# Patient Record
Sex: FEMALE | HISPANIC OR LATINO | Employment: FULL TIME | URBAN - METROPOLITAN AREA
[De-identification: names, ages, dates, MRNs, and addresses within clinical notes are randomized per-mention and may not be internally consistent; named-entity substitution may affect disease eponyms.]

---

## 2022-01-25 LAB
EXTERNAL HEPATITIS B SURFACE ANTIGEN: NEGATIVE
EXTERNAL HEPATITIS C AB: NEGATIVE
EXTERNAL RUBELLA QUALITATIVE: NORMAL
EXTERNAL SYPHILIS RPR SCREEN: NORMAL
HIV1 P24 AG SERPL QL IA: NORMAL

## 2022-01-31 ENCOUNTER — TRANSCRIBE ORDERS (OUTPATIENT)
Dept: ULTRASOUND IMAGING | Facility: HOSPITAL | Age: 37
End: 2022-01-31

## 2022-01-31 DIAGNOSIS — O09.521 AMA (ADVANCED MATERNAL AGE) MULTIGRAVIDA 35+, FIRST TRIMESTER: Primary | ICD-10-CM

## 2022-03-07 ENCOUNTER — TRANSCRIBE ORDERS (OUTPATIENT)
Dept: ULTRASOUND IMAGING | Facility: HOSPITAL | Age: 37
End: 2022-03-07

## 2022-03-07 ENCOUNTER — OFFICE VISIT (OUTPATIENT)
Dept: OBSTETRICS AND GYNECOLOGY | Facility: CLINIC | Age: 37
End: 2022-03-07

## 2022-03-07 ENCOUNTER — HOSPITAL ENCOUNTER (OUTPATIENT)
Dept: ULTRASOUND IMAGING | Facility: HOSPITAL | Age: 37
Discharge: HOME | End: 2022-03-07
Admitting: OBSTETRICS & GYNECOLOGY

## 2022-03-07 VITALS
WEIGHT: 238.8 LBS | TEMPERATURE: 98.2 F | SYSTOLIC BLOOD PRESSURE: 117 MMHG | DIASTOLIC BLOOD PRESSURE: 61 MMHG | HEIGHT: 66 IN | BODY MASS INDEX: 38.38 KG/M2 | HEART RATE: 90 BPM

## 2022-03-07 DIAGNOSIS — Z67.91 RH NEGATIVE STATUS DURING PREGNANCY IN SECOND TRIMESTER: ICD-10-CM

## 2022-03-07 DIAGNOSIS — O26.892 RH NEGATIVE STATUS DURING PREGNANCY IN SECOND TRIMESTER: ICD-10-CM

## 2022-03-07 DIAGNOSIS — O99.210 OBESITY IN PREGNANCY, ANTEPARTUM: ICD-10-CM

## 2022-03-07 DIAGNOSIS — O09.522 MULTIGRAVIDA OF ADVANCED MATERNAL AGE IN SECOND TRIMESTER: Primary | ICD-10-CM

## 2022-03-07 DIAGNOSIS — O09.521 AMA (ADVANCED MATERNAL AGE) MULTIGRAVIDA 35+, FIRST TRIMESTER: ICD-10-CM

## 2022-03-07 PROBLEM — O26.899 RH NEGATIVE STATUS DURING PREGNANCY: Status: ACTIVE | Noted: 2022-03-07

## 2022-03-07 PROCEDURE — 99204 OFFICE O/P NEW MOD 45 MIN: CPT | Performed by: OBSTETRICS & GYNECOLOGY

## 2022-03-07 PROCEDURE — 76811 OB US DETAILED SNGL FETUS: CPT | Performed by: OBSTETRICS & GYNECOLOGY

## 2022-03-07 PROCEDURE — 76811 OB US DETAILED SNGL FETUS: CPT

## 2022-03-07 RX ORDER — PRENATAL VIT NO.126/IRON/FOLIC 28MG-0.8MG
1 TABLET ORAL DAILY
COMMUNITY

## 2022-03-07 NOTE — PROGRESS NOTES
Consultation:     Amira Scanlon is a 36 y.o.  female G 6 P MP 10/05/21 FREEMAN 22 now 21 6/7 weeks seen in consultation as requested by Mell Turcios MD secondary to:    1) AMA -  risk Tri 21 (36 @ [facility])  2) Rh negative  3 )obesity - BMI - 38 54    Vitals:    22 0924   BP: 117/61   Pulse: 90   Temp: 98.2 °F (36.8 °C)     Pre-matt Labs:    A negative/antibody - negative; RI; U C&S - negative; HIV  Hep B sAg - negative; Varicella immune; Hep C - negative; H/H 12.0/36.1; NIPT - low risk.; UDS - negative      Previous Obstetrical History:    OB History    Para Term  AB Living   1             SAB IAB Ectopic Molar Multiple Live Births                    # Outcome Date GA Lbr Andrew/2nd Weight Sex Delivery Anes PTL Lv   1 Current              1)  - First trimester spontaneous  without D&C  2)  -  @ term @, female 8 lb 6 oz, complicated by 4th degree laceration  3)  -  First trimester spontaneous  without D&C  4) 2017 -  First trimester spontaneous  without D&C  5) 2018 - Primary C/S @ term secondary to previous 4th degree laceration, male, 7 lb 14 oz, without complications    Previous Gyn History:    20  Patient denies GC/Chlamydia/Syphilis.    Catamenia -  10 x 28-30 x 5  Contraception - condoms    Previous Medical History:    DM - patient denies HTN - patient denies Asthma - patient denies      Thyroid Disease - patient denies  Rheumatic Fever - patient denies  Heart - patient denies   Lung - patient denies   Liver - patient denies  Kidney - patient denies   Fever - patient denies  TB - patient denies Herpes - patient denies    UTI - patient denies   Epilepsy - patient denies  Other - neg    Previous Surgical History:    1) As above  2)  - Laparoscopic cholecystectomy without complications  3)  - [place] teeth extraction without complications    Medications:    PNV    Allergies:    NKDA        No current outpatient medications on file  prior to visit.     No current facility-administered medications on file prior to visit.        Habits:    Smoking - neg  Drinking - neg  Drugs - neg    Psychosocial:    ; post office    Sexual Abuse History: never  Physical Abuse History: never  Verbal Abuse History: never    Family History:    DM - M, F, B x 2, S x 2 HTN - neg Twins - GGM   Stillborns - neg Birth defects - neg Mental Retardation - neg  Blood Dyscrasias - neg    Anesthesia Complications - neg Genetic - neg  Other - neg    Review of Systems   Otherwise negative      Remainder of exam deferred.      ADVANCED MATERNAL AGE:    General counseling regarding advanced maternal age was then performed.  Included in this counseling was increased risk for Trisomy 21, pre-eclampsia, IUGR, gestational diabetes and   increased  morbidity and mortality.  The roles of early 1 hr. Glucola screening with repeat @ 24-28 weeks, serial U/S every 4 weeks for fetal growth and close A-P surveillance beginning at 32 weeks were discussed.  For patients aged 40 and above, delivery @ 39 weeks is recommended.     Catracho et al: Increased maternal Age and Risk of Fetal death.  NEJM 1995; 333:953-957    David COURTNEY et al:  The impact of maternal age on fetal death: does length of gestation matter?  Am J Obstet Gynecol. 2010 Dec;203 (6):554.e1-8.      TRISOMY 21:    Genetic counseling was then performed.  The association of Trisomy 21 with mental retardation and various birth defects. The risks and benefits of CVS (if first trimester),and amniocentesis  were discussed including a 1% risk of pregnancy loss with CVS  vs. a 1/350 to 1/500 risk for pregnancy loss with amniocentesis were discussed as well as management options.  Also discussed was alternative non-invasive  testing (NIPT) which will identify 99.1 % of fetuses with Trisomy 21.    Results based on maternal serum screening are gestationally age dependent for accurate risk assessment.  Eighty  percent of fetuses with Trisomy 21 will have some marker for Trisomy 21 on U/S but 20 % will not.  Also discussed was alternative non-invasive  testing (NIPT) which will identify 99.1 % of fetuses with Trisomy 21.    Rh NEGATIVE:    General counseling was performed regarding Rh negative status.  The pathogenesis of isoimmunization was explained.  Although historically, the majority of isoimmunization occurred after delivery, a small percentage of isoimmunization has occurred during pregnancy, with or without an episode of vaginal bleeding.  Prophylactic Rhogam @ 28 weeks is recommended with a repeat dose after delivery (within 72 hours), if a Rh positive infant is born.    OBESITY:    General counseling was then performed regarding obesity.  Thirty percent of the American adults are obese (BMI of 30 or greater) and an additional sixty percent are overweight (BMI 25-29).    Fifty-five percent of child bearing women are overweight.  Furthermore, twenty-nine percent are obese and six percent are morbidly obese (BMI of 40 or greater).  Complications specifically related to pregnancy in obese women may include: fetal anomalies, neural tube defects (BMI > 31), fetal macrosomia, gestational diabetes, gestational hypertension, inadequate lactation, increased induction of labor, increased rates of  section, infertility, multifetal gestation, obstructive sleep apnea, operative vaginal delivery,  mortality, placental abruption and previa, PCOD, postpartum and postterm hemorrhage, postterm pregnancy, pre-eclampsia,  labor and delivery, prolonged labor, shoulder dystocia, spontaneous  , urinary tract infections and wound infections.  Recommended weight gain in pregnancy for obese women should be limited to 15 lbs. or less.  First trimester/early second trimester diabetic evaluation should be performed and if negative, followed by re-evaluation at 24-28 weeks.  Serial U/S to evaluate fetal  growth and close A-P surveillance in the third trimester is recommended for morbidly obese patients.    For Class 3 obese patients (BMI 40 or greater), undergoing  section, both mechanical (sequential compression devices) and pharmacologic prophylaxis against DVT (intermediate dosing enoxaparin 1 mg/kg).       ACOG Committee Opinion (# 315, 2005) and AJOG published article by NUVIA Hoyt et al: Maternal Obesity and Risk of  Stillbirth: a [place] analysis 2007.       IMPRESSIONS:    1) AMA -  risk Tri 21 (36 @ [facility])  2) Rh negative  3 Obesity - BMI - 38 54      RECOMMENDATIONS:    1) Follow-up growth and completion of anatomy in 4 weeks  2) 50 gram Glucola now with repeat @ 24-28 weeks to be done by Provider  3) Serial U/S every 4 weeks for fetal growth  4) Weekly BPP beginning @ 32-36 weeks      Total time spent TODAY on this encounter, including pre-visit review of separately obtained history, face-to-face interaction including greater than 50% time spent in consultation performing medically appropriate physical exam, patient counseling/education with greater than 50% in counseling, interpretation of diagnostic results, care coordination and documentation was 40 minutes.      Thank you for utilizing our ultrasound and consultative services.  If I may be of any further service, please do not hesitate to contact me.    Sincerely,    Israel Ko MD  Maternal-Fetal Medicine

## 2022-04-05 ENCOUNTER — OFFICE VISIT (OUTPATIENT)
Dept: OBSTETRICS AND GYNECOLOGY | Facility: CLINIC | Age: 37
End: 2022-04-05

## 2022-04-05 ENCOUNTER — HOSPITAL ENCOUNTER (OUTPATIENT)
Dept: ULTRASOUND IMAGING | Facility: HOSPITAL | Age: 37
Discharge: HOME | End: 2022-04-05
Admitting: OBSTETRICS & GYNECOLOGY

## 2022-04-05 VITALS
HEIGHT: 66 IN | SYSTOLIC BLOOD PRESSURE: 118 MMHG | WEIGHT: 243.6 LBS | BODY MASS INDEX: 39.15 KG/M2 | DIASTOLIC BLOOD PRESSURE: 82 MMHG | TEMPERATURE: 98 F | HEART RATE: 92 BPM

## 2022-04-05 DIAGNOSIS — O99.210 OBESITY IN PREGNANCY, ANTEPARTUM: Primary | ICD-10-CM

## 2022-04-05 DIAGNOSIS — O09.522 MULTIGRAVIDA OF ADVANCED MATERNAL AGE IN SECOND TRIMESTER: ICD-10-CM

## 2022-04-05 PROCEDURE — 76816 OB US FOLLOW-UP PER FETUS: CPT

## 2022-04-05 PROCEDURE — 99213 OFFICE O/P EST LOW 20 MIN: CPT | Performed by: OBSTETRICS & GYNECOLOGY

## 2022-04-05 PROCEDURE — 76816 OB US FOLLOW-UP PER FETUS: CPT | Performed by: OBSTETRICS & GYNECOLOGY

## 2022-04-05 NOTE — PROGRESS NOTES
Pt reports that she is doing well and denies vaginal bleeding, cramping, contractions, LOF. Notes some discomfort near her previous  incision. Amira notes that she will place her hand over the scar and hold her belly up for relief until the discomfort is gone. Discussed precautions and signs to look for. Reports active fetal movement. Next OB appointment scheduled for .

## 2022-04-08 NOTE — PROGRESS NOTES
"Growth ultrasound    MATERNAL FETAL MEDICINE VISIT  Amira Scanlon is a 36 y.o.  at 26w3d  being seen today by Maternal Fetal Medicine for follow up growth and anatomy    REVIEW OF SYSTEMS:  Denies nausea or vomiting  Denies abdominal pain/cramping/tenderness/contractions  Reports + fetal movement  Denies vaginal bleeding or leaking of fluid    PHYSICAL EXAM:  /82 (BP Location: Right arm, Patient Position: Sitting)   Pulse 92   Temp 98 °F (36.7 °C)   Ht 167.6 cm (66\")   Wt 110 kg (243 lb 9.6 oz)   BMI 39.32 kg/m²   General: pleasant, alert and oriented  Abdomen: soft, non tender  Extremites: bilat lower extremities soft, non tender, no edema noted    ULTRASOUND:  Appropriate interval growth    Thank you for allowing us to participate in the care of this patient.  Please call with any questions.    Again thank you for requesting a MFM consult.  If we can be of any further assistance to you, please do not hesitate to contact us.    Amira Kaufman MD  MATERNAL FETAL MEDICINE              VISIT SYNOPSIS:    ASSESSMENT/PLAN:  MS. Amira Scanlon is a 36 y.o.  at 26w3d with the following visit diagnosis    Diagnoses and all orders for this visit:    1. Obesity in pregnancy, antepartum (Primary)        The above diagnosis have been evaluated and determined to be stable    This note has been routed to Dr. Turcios     At the end of this consultation all patient questions were answered, concerns addressed, and comprehensive management plan and follow up reviewed with patient.      I spent 20 minutes caring for Amira on this date of service. This time includes time spent by me in the following activities: reviewing tests, obtaining and/or reviewing a separately obtained history, performing a medically appropriate examination and/or evaluation, counseling and educating the patient/family/caregiver and independently interpreting results and communicating that information with the patient/family/caregiver with " greater than 50% spent in counseling and coordination of care.

## 2022-05-02 PROBLEM — R00.2 PALPITATIONS: Status: ACTIVE | Noted: 2022-05-02

## 2022-05-03 ENCOUNTER — OFFICE VISIT (OUTPATIENT)
Dept: CARDIOLOGY | Facility: CLINIC | Age: 37
End: 2022-05-03

## 2022-05-03 VITALS
HEIGHT: 66 IN | WEIGHT: 247 LBS | DIASTOLIC BLOOD PRESSURE: 64 MMHG | BODY MASS INDEX: 39.7 KG/M2 | HEART RATE: 88 BPM | SYSTOLIC BLOOD PRESSURE: 118 MMHG

## 2022-05-03 DIAGNOSIS — R00.2 PALPITATIONS: Primary | ICD-10-CM

## 2022-05-03 PROBLEM — Z3A.27 27 WEEKS GESTATION OF PREGNANCY: Status: ACTIVE | Noted: 2022-05-03

## 2022-05-03 PROCEDURE — 99204 OFFICE O/P NEW MOD 45 MIN: CPT | Performed by: INTERNAL MEDICINE

## 2022-05-03 NOTE — PROGRESS NOTES
Chief Complaint  Palpitations    Subjective    History of Present Illness      I had the pleasure of seeing  in the office today.  She is a new patient to me.  I have reviewed her outside records.  She is a very pleasant 36-year-old female who is currently 27 weeks pregnant with her first child.  She is currently had an uneventful pregnancy.  She states that she generally has had some intermittent palpitations throughout her pregnancy which have been short-lived.  On 4/14/2022, she states that she began having palpitations which more prominent and lasted longer than her previous symptoms.  She had associated shortness of air but no chest discomfort or chest pressure.  No known blood clots.  No lightheadedness or dizziness.  She had not been experiencing fever or chills.  She did present to [facility] emergency department.  She was hemodynamically stable with a blood pressure 128/82 and her heart rate was 90.  Her hemoglobin was normal.  Her troponin was normal.  Her potassium was 3.8, glucose was normal, BUN was normal, magnesium was normal.  Liver enzymes were normal.  Her proBNP was normal.  Her D-dimer was noted to be elevated.    She underwent CT angiogram of the chest for pulmonary embolus.  There was no pulmonary embolism.  There is borderline cardiomegaly and a small hiatal hernia she was ultimately discharged from the emergency department and follows up in the office today.    Ms. Scanlon states that she has had no further palpitations similar to what she was experiencing in the emergency department.  She states that she does have occasional palpitations.  She has come to define these as being normal throughout her pregnancy.  She had an EKG at the time that she was in the ED on 4/14/2022 which was normal sinus rhythm with a first-degree AV block.    He has no known history of hypertension or hyperlipidemia.  No diabetes.  She has no family history of premature cardiac disease.  No known chronic  "kidney disease.  No thyroid disease.  No history of rheumatic fever scarlet fever.  She does not use tobacco or alcohol.  No known hypercoagulable state.    Objective   Vital Signs:   /64   Pulse 88   Ht 167.6 cm (66\")   Wt 112 kg (247 lb)   BMI 39.87 kg/m²     Vitals and nursing note reviewed.   Constitutional:       Appearance: Healthy appearance. Not in distress.   Eyes:      Conjunctiva/sclera: Conjunctivae normal.      Pupils: Pupils are equal, round, and reactive to light.   Neck:      Thyroid: Thyroid normal.      Vascular: No JVR. JVD normal.   Pulmonary:      Effort: Pulmonary effort is normal.      Breath sounds: Normal breath sounds. No wheezing. No rhonchi. No rales.   Chest:      Chest wall: Not tender to palpatation.   Cardiovascular:      PMI at left midclavicular line. Normal rate. Regular rhythm. Normal S1. Normal S2.      Murmurs: There is no murmur.      No gallop. No click. No rub.   Pulses:     Intact distal pulses.   Edema:     Peripheral edema absent.   Abdominal:      General: Bowel sounds are normal. There is no abdominal bruit.      Palpations: Abdomen is soft. There is no hepatomegaly.      Tenderness: There is no abdominal tenderness.      Comments: Patient is pregnant   Musculoskeletal: Normal range of motion.         General: No tenderness.      Cervical back: Normal range of motion and neck supple. Skin:     General: Skin is warm and dry.   Neurological:      General: No focal deficit present.      Mental Status: Alert and oriented to person, place and time.      Gait: Gait is intact.         Result Review :        Please see labs as mentioned in history of present illness            Assessment and Plan    1. Palpitations  Ms. Scanlon presents for evaluation.  She had an episode of palpitations which prompted an emergency room visit.  Her electrolytes were normal.  Her D-dimer was elevated.  Her CT scan with PE protocol was negative for pulmonary embolism.  Borderline " cardiomegaly was mentioned.  I am going to place a 2-week monitor.  I will also order an echocardiogram for assessment of her left ventricular chamber size and function.  I have not initiated any medication at this juncture.  - Holter Monitor - 72 Hour Up To 15 Days; Future        Follow Up   No follow-ups on file.  Patient was given instructions and counseling regarding her condition or for health maintenance advice. Please see specific information pulled into the AVS if appropriate.

## 2022-06-20 ENCOUNTER — HOSPITAL ENCOUNTER (OUTPATIENT)
Dept: CARDIOLOGY | Facility: HOSPITAL | Age: 37
Discharge: HOME | End: 2022-06-20
Admitting: INTERNAL MEDICINE

## 2022-06-20 VITALS
BODY MASS INDEX: 39.7 KG/M2 | HEIGHT: 66 IN | SYSTOLIC BLOOD PRESSURE: 133 MMHG | DIASTOLIC BLOOD PRESSURE: 87 MMHG | WEIGHT: 247 LBS

## 2022-06-20 DIAGNOSIS — R00.2 PALPITATIONS: ICD-10-CM

## 2022-06-20 PROCEDURE — 93306 TTE W/DOPPLER COMPLETE: CPT | Performed by: INTERNAL MEDICINE

## 2022-06-20 PROCEDURE — 93306 TTE W/DOPPLER COMPLETE: CPT

## 2022-06-21 LAB
AORTIC DIMENSIONLESS INDEX: 0.7 (DI)
BH CV ECHO MEAS - AO MAX PG: 8.3 MMHG
BH CV ECHO MEAS - AO MEAN PG: 5 MMHG
BH CV ECHO MEAS - AO ROOT DIAM: 2.7 CM
BH CV ECHO MEAS - AO V2 MAX: 144.5 CM/SEC
BH CV ECHO MEAS - AO V2 VTI: 29.8 CM
BH CV ECHO MEAS - AVA(I,D): 2.16 CM2
BH CV ECHO MEAS - EDV(CUBED): 111.7 ML
BH CV ECHO MEAS - EDV(MOD-SP2): 72 ML
BH CV ECHO MEAS - EDV(MOD-SP4): 63 ML
BH CV ECHO MEAS - EF(MOD-BP): 56 %
BH CV ECHO MEAS - EF(MOD-SP2): 55.6 %
BH CV ECHO MEAS - EF(MOD-SP4): 54 %
BH CV ECHO MEAS - ESV(CUBED): 39.7 ML
BH CV ECHO MEAS - ESV(MOD-SP2): 32 ML
BH CV ECHO MEAS - ESV(MOD-SP4): 29 ML
BH CV ECHO MEAS - FS: 29.2 %
BH CV ECHO MEAS - IVS/LVPW: 1.01 CM
BH CV ECHO MEAS - IVSD: 0.94 CM
BH CV ECHO MEAS - LAT PEAK E' VEL: 10.2 CM/SEC
BH CV ECHO MEAS - LV DIASTOLIC VOL/BSA (35-75): 28.8 CM2
BH CV ECHO MEAS - LV MASS(C)D: 156.6 GRAMS
BH CV ECHO MEAS - LV MAX PG: 5.1 MMHG
BH CV ECHO MEAS - LV MEAN PG: 2.9 MMHG
BH CV ECHO MEAS - LV SYSTOLIC VOL/BSA (12-30): 13.3 CM2
BH CV ECHO MEAS - LV V1 MAX: 113.2 CM/SEC
BH CV ECHO MEAS - LV V1 VTI: 21.8 CM
BH CV ECHO MEAS - LVIDD: 4.8 CM
BH CV ECHO MEAS - LVIDS: 3.4 CM
BH CV ECHO MEAS - LVOT AREA: 3 CM2
BH CV ECHO MEAS - LVOT DIAM: 1.94 CM
BH CV ECHO MEAS - LVPWD: 0.93 CM
BH CV ECHO MEAS - MED PEAK E' VEL: 7.6 CM/SEC
BH CV ECHO MEAS - MR MAX PG: 45.6 MMHG
BH CV ECHO MEAS - MR MAX VEL: 337.5 CM/SEC
BH CV ECHO MEAS - MV A DUR: 0.11 SEC
BH CV ECHO MEAS - MV A MAX VEL: 59.6 CM/SEC
BH CV ECHO MEAS - MV DEC SLOPE: 504.9 CM/SEC2
BH CV ECHO MEAS - MV DEC TIME: 0.2 MSEC
BH CV ECHO MEAS - MV E MAX VEL: 88.7 CM/SEC
BH CV ECHO MEAS - MV E/A: 1.49
BH CV ECHO MEAS - MV MAX PG: 3.6 MMHG
BH CV ECHO MEAS - MV MEAN PG: 1.64 MMHG
BH CV ECHO MEAS - MV P1/2T: 56.1 MSEC
BH CV ECHO MEAS - MV V2 VTI: 23.8 CM
BH CV ECHO MEAS - MVA(P1/2T): 3.9 CM2
BH CV ECHO MEAS - MVA(VTI): 2.7 CM2
BH CV ECHO MEAS - PA ACC TIME: 0.11 SEC
BH CV ECHO MEAS - PA PR(ACCEL): 31.5 MMHG
BH CV ECHO MEAS - PA V2 MAX: 114.7 CM/SEC
BH CV ECHO MEAS - PI END-D VEL: 116.4 CM/SEC
BH CV ECHO MEAS - QP/QS: 0.89
BH CV ECHO MEAS - RAP SYSTOLE: 8 MMHG
BH CV ECHO MEAS - RV MAX PG: 1.48 MMHG
BH CV ECHO MEAS - RV V1 MAX: 60.8 CM/SEC
BH CV ECHO MEAS - RV V1 VTI: 12.2 CM
BH CV ECHO MEAS - RVOT DIAM: 2.45 CM
BH CV ECHO MEAS - SI(MOD-SP2): 18.3 ML/M2
BH CV ECHO MEAS - SI(MOD-SP4): 15.5 ML/M2
BH CV ECHO MEAS - SV(LVOT): 64.3 ML
BH CV ECHO MEAS - SV(MOD-SP2): 40 ML
BH CV ECHO MEAS - SV(MOD-SP4): 34 ML
BH CV ECHO MEAS - SV(RVOT): 57.4 ML
BH CV ECHO MEAS - TAPSE (>1.6): 2.1 CM
BH CV ECHO MEASUREMENTS AVERAGE E/E' RATIO: 9.97
BH CV XLRA - RV BASE: 2.8 CM
BH CV XLRA - RV LENGTH: 7.3 CM
BH CV XLRA - RV MID: 2.9 CM
BH CV XLRA - TDI S': 10.1 CM/SEC
LEFT ATRIUM VOLUME INDEX: 19.4 ML/M2
MAXIMAL PREDICTED HEART RATE: 184 BPM
STRESS TARGET HR: 156 BPM

## 2022-07-07 ENCOUNTER — HOSPITAL ENCOUNTER (INPATIENT)
Facility: HOSPITAL | Age: 37
LOS: 3 days | Discharge: HOME | End: 2022-07-10
Attending: OBSTETRICS & GYNECOLOGY | Admitting: OBSTETRICS & GYNECOLOGY

## 2022-07-07 ENCOUNTER — ANESTHESIA EVENT (OUTPATIENT)
Dept: LABOR AND DELIVERY | Facility: HOSPITAL | Age: 37
End: 2022-07-07

## 2022-07-07 ENCOUNTER — ANESTHESIA (OUTPATIENT)
Dept: LABOR AND DELIVERY | Facility: HOSPITAL | Age: 37
End: 2022-07-07

## 2022-07-07 PROBLEM — Z34.90 PREGNANCY: Status: ACTIVE | Noted: 2022-07-07

## 2022-07-07 LAB
ABO GROUP BLD: NORMAL
ABO GROUP BLD: NORMAL
BLD GP AB SCN SERPL QL: NEGATIVE
DEPRECATED RDW RBC AUTO: 43.9 FL (ref 37–54)
ERYTHROCYTE [DISTWIDTH] IN BLOOD BY AUTOMATED COUNT: 14.5 % (ref 12.3–15.4)
FETAL BLEED: NEGATIVE
HCT VFR BLD AUTO: 32.4 % (ref 34–46.6)
HGB BLD-MCNC: 11.3 G/DL (ref 12–15.9)
MCH RBC QN AUTO: 29.6 PG (ref 26.6–33)
MCHC RBC AUTO-ENTMCNC: 34.9 G/DL (ref 31.5–35.7)
MCV RBC AUTO: 84.8 FL (ref 79–97)
NUMBER OF DOSES: NORMAL
PLATELET # BLD AUTO: 205 10*3/MM3 (ref 140–450)
PMV BLD AUTO: 10.3 FL (ref 6–12)
RBC # BLD AUTO: 3.82 10*6/MM3 (ref 3.77–5.28)
RH BLD: NEGATIVE
RH BLD: NEGATIVE
SARS-COV-2 RNA PNL SPEC NAA+PROBE: NOT DETECTED
T&S EXPIRATION DATE: NORMAL
WBC NRBC COR # BLD: 10.16 10*3/MM3 (ref 3.4–10.8)

## 2022-07-07 PROCEDURE — 86900 BLOOD TYPING SEROLOGIC ABO: CPT | Performed by: OBSTETRICS & GYNECOLOGY

## 2022-07-07 PROCEDURE — 25010000002 KETOROLAC TROMETHAMINE PER 15 MG: Performed by: NURSE ANESTHETIST, CERTIFIED REGISTERED

## 2022-07-07 PROCEDURE — 86901 BLOOD TYPING SEROLOGIC RH(D): CPT | Performed by: OBSTETRICS & GYNECOLOGY

## 2022-07-07 PROCEDURE — 85027 COMPLETE CBC AUTOMATED: CPT | Performed by: OBSTETRICS & GYNECOLOGY

## 2022-07-07 PROCEDURE — 25010000002 MORPHINE PER 10 MG: Performed by: ANESTHESIOLOGY

## 2022-07-07 PROCEDURE — 25010000002 CEFAZOLIN PER 500 MG: Performed by: OBSTETRICS & GYNECOLOGY

## 2022-07-07 PROCEDURE — 86850 RBC ANTIBODY SCREEN: CPT | Performed by: OBSTETRICS & GYNECOLOGY

## 2022-07-07 PROCEDURE — 85461 HEMOGLOBIN FETAL: CPT | Performed by: OBSTETRICS & GYNECOLOGY

## 2022-07-07 PROCEDURE — 25010000002 RHO D IMMUNE GLOBULIN 1500 UNIT/2ML SOLUTION PREFILLED SYRINGE: Performed by: OBSTETRICS & GYNECOLOGY

## 2022-07-07 PROCEDURE — 25010000002 ONDANSETRON PER 1 MG: Performed by: ANESTHESIOLOGY

## 2022-07-07 PROCEDURE — 25010000002 PHENYLEPHRINE 10 MG/ML SOLUTION: Performed by: NURSE ANESTHETIST, CERTIFIED REGISTERED

## 2022-07-07 PROCEDURE — 87635 SARS-COV-2 COVID-19 AMP PRB: CPT | Performed by: OBSTETRICS & GYNECOLOGY

## 2022-07-07 RX ORDER — OXYTOCIN/0.9 % SODIUM CHLORIDE 30/500 ML
PLASTIC BAG, INJECTION (ML) INTRAVENOUS
Status: COMPLETED
Start: 2022-07-07 | End: 2022-07-07

## 2022-07-07 RX ORDER — FERROUS SULFATE 325(65) MG
325 TABLET ORAL
COMMUNITY

## 2022-07-07 RX ORDER — OXYCODONE HYDROCHLORIDE AND ACETAMINOPHEN 5; 325 MG/1; MG/1
1 TABLET ORAL EVERY 4 HOURS PRN
Status: DISCONTINUED | OUTPATIENT
Start: 2022-07-07 | End: 2022-07-10 | Stop reason: HOSPADM

## 2022-07-07 RX ORDER — SODIUM CHLORIDE 0.9 % (FLUSH) 0.9 %
10 SYRINGE (ML) INJECTION AS NEEDED
Status: DISCONTINUED | OUTPATIENT
Start: 2022-07-07 | End: 2022-07-07 | Stop reason: HOSPADM

## 2022-07-07 RX ORDER — MORPHINE SULFATE 1 MG/ML
INJECTION, SOLUTION EPIDURAL; INTRATHECAL; INTRAVENOUS
Status: COMPLETED | OUTPATIENT
Start: 2022-07-07 | End: 2022-07-07

## 2022-07-07 RX ORDER — OXYTOCIN/0.9 % SODIUM CHLORIDE 30/500 ML
999 PLASTIC BAG, INJECTION (ML) INTRAVENOUS ONCE
Status: COMPLETED | OUTPATIENT
Start: 2022-07-07 | End: 2022-07-07

## 2022-07-07 RX ORDER — NALOXONE HCL 0.4 MG/ML
0.2 VIAL (ML) INJECTION
Status: DISCONTINUED | OUTPATIENT
Start: 2022-07-07 | End: 2022-07-10 | Stop reason: HOSPADM

## 2022-07-07 RX ORDER — MORPHINE SULFATE 2 MG/ML
2 INJECTION, SOLUTION INTRAMUSCULAR; INTRAVENOUS
Status: ACTIVE | OUTPATIENT
Start: 2022-07-07 | End: 2022-07-07

## 2022-07-07 RX ORDER — OXYTOCIN/0.9 % SODIUM CHLORIDE 30/500 ML
125 PLASTIC BAG, INJECTION (ML) INTRAVENOUS CONTINUOUS PRN
Status: DISCONTINUED | OUTPATIENT
Start: 2022-07-07 | End: 2022-07-10 | Stop reason: HOSPADM

## 2022-07-07 RX ORDER — BUPIVACAINE HYDROCHLORIDE 7.5 MG/ML
INJECTION, SOLUTION EPIDURAL; RETROBULBAR
Status: COMPLETED | OUTPATIENT
Start: 2022-07-07 | End: 2022-07-07

## 2022-07-07 RX ORDER — ONDANSETRON 2 MG/ML
4 INJECTION INTRAMUSCULAR; INTRAVENOUS EVERY 6 HOURS PRN
Status: DISCONTINUED | OUTPATIENT
Start: 2022-07-07 | End: 2022-07-10 | Stop reason: HOSPADM

## 2022-07-07 RX ORDER — MORPHINE SULFATE 2 MG/ML
2 INJECTION, SOLUTION INTRAMUSCULAR; INTRAVENOUS EVERY 4 HOURS PRN
Status: DISCONTINUED | OUTPATIENT
Start: 2022-07-07 | End: 2022-07-10 | Stop reason: HOSPADM

## 2022-07-07 RX ORDER — CARBOPROST TROMETHAMINE 250 UG/ML
250 INJECTION, SOLUTION INTRAMUSCULAR
Status: DISCONTINUED | OUTPATIENT
Start: 2022-07-07 | End: 2022-07-07 | Stop reason: HOSPADM

## 2022-07-07 RX ORDER — SODIUM CHLORIDE, SODIUM LACTATE, POTASSIUM CHLORIDE, CALCIUM CHLORIDE 600; 310; 30; 20 MG/100ML; MG/100ML; MG/100ML; MG/100ML
125 INJECTION, SOLUTION INTRAVENOUS CONTINUOUS
Status: DISCONTINUED | OUTPATIENT
Start: 2022-07-07 | End: 2022-07-07

## 2022-07-07 RX ORDER — LIDOCAINE HYDROCHLORIDE 10 MG/ML
5 INJECTION, SOLUTION EPIDURAL; INFILTRATION; INTRACAUDAL; PERINEURAL AS NEEDED
Status: DISCONTINUED | OUTPATIENT
Start: 2022-07-07 | End: 2022-07-07 | Stop reason: HOSPADM

## 2022-07-07 RX ORDER — PHENYLEPHRINE HYDROCHLORIDE 10 MG/ML
INJECTION INTRAVENOUS AS NEEDED
Status: DISCONTINUED | OUTPATIENT
Start: 2022-07-07 | End: 2022-07-07 | Stop reason: SURG

## 2022-07-07 RX ORDER — FAMOTIDINE 10 MG/ML
20 INJECTION, SOLUTION INTRAVENOUS ONCE AS NEEDED
Status: COMPLETED | OUTPATIENT
Start: 2022-07-07 | End: 2022-07-07

## 2022-07-07 RX ORDER — ONDANSETRON 2 MG/ML
4 INJECTION INTRAMUSCULAR; INTRAVENOUS ONCE AS NEEDED
Status: DISCONTINUED | OUTPATIENT
Start: 2022-07-07 | End: 2022-07-10 | Stop reason: HOSPADM

## 2022-07-07 RX ORDER — MORPHINE SULFATE 2 MG/ML
1 INJECTION, SOLUTION INTRAMUSCULAR; INTRAVENOUS EVERY 4 HOURS PRN
Status: DISCONTINUED | OUTPATIENT
Start: 2022-07-07 | End: 2022-07-10 | Stop reason: HOSPADM

## 2022-07-07 RX ORDER — OXYTOCIN/0.9 % SODIUM CHLORIDE 30/500 ML
250 PLASTIC BAG, INJECTION (ML) INTRAVENOUS CONTINUOUS PRN
Status: ACTIVE | OUTPATIENT
Start: 2022-07-07 | End: 2022-07-07

## 2022-07-07 RX ORDER — PHYTONADIONE 1 MG/.5ML
INJECTION, EMULSION INTRAMUSCULAR; INTRAVENOUS; SUBCUTANEOUS
Status: ACTIVE
Start: 2022-07-07 | End: 2022-07-07

## 2022-07-07 RX ORDER — MISOPROSTOL 200 UG/1
800 TABLET ORAL ONCE AS NEEDED
Status: DISCONTINUED | OUTPATIENT
Start: 2022-07-07 | End: 2022-07-07 | Stop reason: HOSPADM

## 2022-07-07 RX ORDER — DIPHENHYDRAMINE HYDROCHLORIDE 50 MG/ML
25 INJECTION INTRAMUSCULAR; INTRAVENOUS EVERY 4 HOURS PRN
Status: DISCONTINUED | OUTPATIENT
Start: 2022-07-07 | End: 2022-07-10 | Stop reason: HOSPADM

## 2022-07-07 RX ORDER — NALOXONE HCL 0.4 MG/ML
0.4 VIAL (ML) INJECTION
Status: DISCONTINUED | OUTPATIENT
Start: 2022-07-07 | End: 2022-07-10 | Stop reason: HOSPADM

## 2022-07-07 RX ORDER — ERYTHROMYCIN 5 MG/G
OINTMENT OPHTHALMIC
Status: ACTIVE
Start: 2022-07-07 | End: 2022-07-07

## 2022-07-07 RX ORDER — DIPHENHYDRAMINE HCL 25 MG
25 CAPSULE ORAL EVERY 4 HOURS PRN
Status: DISCONTINUED | OUTPATIENT
Start: 2022-07-07 | End: 2022-07-10 | Stop reason: HOSPADM

## 2022-07-07 RX ORDER — FAMOTIDINE 10 MG
10 TABLET ORAL 2 TIMES DAILY
COMMUNITY

## 2022-07-07 RX ORDER — EPHEDRINE SULFATE 50 MG/ML
INJECTION, SOLUTION INTRAVENOUS AS NEEDED
Status: DISCONTINUED | OUTPATIENT
Start: 2022-07-07 | End: 2022-07-07 | Stop reason: SURG

## 2022-07-07 RX ORDER — SODIUM CHLORIDE 0.9 % (FLUSH) 0.9 %
10 SYRINGE (ML) INJECTION EVERY 12 HOURS SCHEDULED
Status: DISCONTINUED | OUTPATIENT
Start: 2022-07-07 | End: 2022-07-07 | Stop reason: HOSPADM

## 2022-07-07 RX ORDER — ONDANSETRON 4 MG/1
4 TABLET, FILM COATED ORAL EVERY 8 HOURS PRN
Status: DISCONTINUED | OUTPATIENT
Start: 2022-07-07 | End: 2022-07-10 | Stop reason: HOSPADM

## 2022-07-07 RX ORDER — HYDROMORPHONE HYDROCHLORIDE 1 MG/ML
0.5 INJECTION, SOLUTION INTRAMUSCULAR; INTRAVENOUS; SUBCUTANEOUS
Status: CANCELLED | OUTPATIENT
Start: 2022-07-07 | End: 2022-07-07

## 2022-07-07 RX ORDER — METHYLERGONOVINE MALEATE 0.2 MG/ML
200 INJECTION INTRAVENOUS ONCE AS NEEDED
Status: DISCONTINUED | OUTPATIENT
Start: 2022-07-07 | End: 2022-07-07 | Stop reason: HOSPADM

## 2022-07-07 RX ORDER — METRONIDAZOLE 500 MG/1
500 TABLET ORAL EVERY 8 HOURS SCHEDULED
Status: COMPLETED | OUTPATIENT
Start: 2022-07-07 | End: 2022-07-09

## 2022-07-07 RX ORDER — ONDANSETRON 2 MG/ML
4 INJECTION INTRAMUSCULAR; INTRAVENOUS ONCE AS NEEDED
Status: COMPLETED | OUTPATIENT
Start: 2022-07-07 | End: 2022-07-07

## 2022-07-07 RX ORDER — CEFAZOLIN SODIUM IN 0.9 % NACL 3 G/100 ML
3 INTRAVENOUS SOLUTION, PIGGYBACK (ML) INTRAVENOUS ONCE
Status: COMPLETED | OUTPATIENT
Start: 2022-07-07 | End: 2022-07-07

## 2022-07-07 RX ORDER — CEPHALEXIN 500 MG/1
500 CAPSULE ORAL EVERY 8 HOURS SCHEDULED
Status: COMPLETED | OUTPATIENT
Start: 2022-07-07 | End: 2022-07-09

## 2022-07-07 RX ORDER — HYDROCODONE BITARTRATE AND ACETAMINOPHEN 5; 325 MG/1; MG/1
1 TABLET ORAL EVERY 4 HOURS PRN
Status: DISCONTINUED | OUTPATIENT
Start: 2022-07-07 | End: 2022-07-10 | Stop reason: HOSPADM

## 2022-07-07 RX ORDER — ACETAMINOPHEN 325 MG/1
650 TABLET ORAL EVERY 4 HOURS PRN
Status: DISCONTINUED | OUTPATIENT
Start: 2022-07-07 | End: 2022-07-10 | Stop reason: HOSPADM

## 2022-07-07 RX ORDER — KETOROLAC TROMETHAMINE 30 MG/ML
INJECTION, SOLUTION INTRAMUSCULAR; INTRAVENOUS AS NEEDED
Status: DISCONTINUED | OUTPATIENT
Start: 2022-07-07 | End: 2022-07-07 | Stop reason: SURG

## 2022-07-07 RX ORDER — ACETAMINOPHEN 325 MG/1
650 TABLET ORAL ONCE
Status: COMPLETED | OUTPATIENT
Start: 2022-07-07 | End: 2022-07-07

## 2022-07-07 RX ORDER — IBUPROFEN 600 MG/1
600 TABLET ORAL EVERY 6 HOURS SCHEDULED
Status: DISCONTINUED | OUTPATIENT
Start: 2022-07-07 | End: 2022-07-10 | Stop reason: HOSPADM

## 2022-07-07 RX ADMIN — PHENYLEPHRINE HYDROCHLORIDE 100 MCG: 10 INJECTION INTRAVENOUS at 10:07

## 2022-07-07 RX ADMIN — CEPHALEXIN 500 MG: 500 CAPSULE ORAL at 16:49

## 2022-07-07 RX ADMIN — ACETAMINOPHEN 650 MG: 325 TABLET, FILM COATED ORAL at 18:35

## 2022-07-07 RX ADMIN — CEFAZOLIN 3 G: 10 INJECTION, POWDER, FOR SOLUTION INTRAVENOUS at 09:38

## 2022-07-07 RX ADMIN — FAMOTIDINE 20 MG: 10 INJECTION, SOLUTION INTRAVENOUS at 09:38

## 2022-07-07 RX ADMIN — MORPHINE SULFATE 200 MCG: 1 INJECTION, SOLUTION EPIDURAL; INTRATHECAL; INTRAVENOUS at 10:01

## 2022-07-07 RX ADMIN — CEPHALEXIN 500 MG: 500 CAPSULE ORAL at 21:37

## 2022-07-07 RX ADMIN — SODIUM CHLORIDE, POTASSIUM CHLORIDE, SODIUM LACTATE AND CALCIUM CHLORIDE: 600; 310; 30; 20 INJECTION, SOLUTION INTRAVENOUS at 09:51

## 2022-07-07 RX ADMIN — BUPIVACAINE HYDROCHLORIDE 1.8 ML: 7.5 INJECTION, SOLUTION EPIDURAL; RETROBULBAR at 10:01

## 2022-07-07 RX ADMIN — KETOROLAC TROMETHAMINE 30 MG: 30 INJECTION, SOLUTION INTRAMUSCULAR; INTRAVENOUS at 10:49

## 2022-07-07 RX ADMIN — EPHEDRINE SULFATE 10 MG: 50 INJECTION INTRAVENOUS at 10:15

## 2022-07-07 RX ADMIN — Medication 30 UNITS: at 12:16

## 2022-07-07 RX ADMIN — METRONIDAZOLE 500 MG: 500 TABLET, FILM COATED ORAL at 18:21

## 2022-07-07 RX ADMIN — SODIUM CHLORIDE, POTASSIUM CHLORIDE, SODIUM LACTATE AND CALCIUM CHLORIDE 1000 ML: 600; 310; 30; 20 INJECTION, SOLUTION INTRAVENOUS at 07:20

## 2022-07-07 RX ADMIN — ONDANSETRON 4 MG: 2 INJECTION INTRAMUSCULAR; INTRAVENOUS at 09:38

## 2022-07-07 RX ADMIN — ACETAMINOPHEN 325MG 650 MG: 325 TABLET ORAL at 09:39

## 2022-07-07 RX ADMIN — IBUPROFEN 600 MG: 600 TABLET ORAL at 18:21

## 2022-07-07 RX ADMIN — EPHEDRINE SULFATE 5 MG: 50 INJECTION INTRAVENOUS at 10:18

## 2022-07-07 RX ADMIN — PHENYLEPHRINE HYDROCHLORIDE 100 MCG: 10 INJECTION INTRAVENOUS at 10:12

## 2022-07-07 RX ADMIN — Medication 999 ML/HR: at 10:23

## 2022-07-07 RX ADMIN — HUMAN RHO(D) IMMUNE GLOBULIN 1500 UNITS: 1500 SOLUTION INTRAMUSCULAR; INTRAVENOUS at 20:21

## 2022-07-07 RX ADMIN — PHENYLEPHRINE HYDROCHLORIDE 100 MCG: 10 INJECTION INTRAVENOUS at 10:03

## 2022-07-07 NOTE — PROGRESS NOTES
I was scrubbed and actively participating as first assistant, including providing exposure, delivery assistance, hemostatic maneuvers and closure for Dr. Lopez.

## 2022-07-07 NOTE — ANESTHESIA PREPROCEDURE EVALUATION
Anesthesia Evaluation     Patient summary reviewed and Nursing notes reviewed   NPO Solid Status: > 8 hours  NPO Liquid Status: > 2 hours           Airway   Mallampati: II  TM distance: >3 FB  Neck ROM: full  no difficulty expected  Dental - normal exam     Pulmonary - negative pulmonary ROS and normal exam   (-) decreased breath sounds, wheezes  Cardiovascular - normal exam  Exercise tolerance: good (4-7 METS)    (-) hypertension      Neuro/Psych- negative ROS  (-) seizures, CVA  GI/Hepatic/Renal/Endo    (+) morbid obesity,    (-) diabetes    Musculoskeletal (-) negative ROS    Abdominal  - normal exam   Substance History - negative use  (-) alcohol use, drug use     OB/GYN negative ob/gyn ROS         Other - negative ROS                       Anesthesia Plan    ASA 3     spinal       Anesthetic plan, risks, benefits, and alternatives have been provided, discussed and informed consent has been obtained with: patient.    Plan discussed with CRNA.        CODE STATUS:

## 2022-07-07 NOTE — ANESTHESIA PROCEDURE NOTES
Spinal Block      Patient location during procedure: OB  Indication:at surgeon's request and post-op pain management  Performed By  Anesthesiologist: Alpesh Hector MD  Preanesthetic Checklist  Completed: patient identified, IV checked, site marked, risks and benefits discussed, surgical consent, monitors and equipment checked, pre-op evaluation and timeout performed  Spinal Block Prep:  Patient Position:sitting  Sterile Tech:cap, gown, mask, sterile barriers and gloves  Prep:Chloraprep  Patient Monitoring:blood pressure monitoring, continuous pulse oximetry and EKG  Spinal Block Procedure  Approach:midline  Guidance:landmark technique and palpation technique  Location:L4-L5  Needle Type:Rhianna  Needle Gauge:25 G  Placement of Spinal needle event:cerebrospinal fluid aspirated  Paresthesia: no  Fluid Appearance:clear  Medications: Morphine PF injection, 200 mcg  bupivacaine PF (MARCAINE) 0.75 % injection, 1.8 mL  Med Administered at 7/7/2022 10:01 AM   Post Assessment  Patient Tolerance:patient tolerated the procedure well with no apparent complications  Complications no  Additional Notes  Long needle needed

## 2022-07-07 NOTE — ANESTHESIA POSTPROCEDURE EVALUATION
"Patient: Amira Scanlon    Procedure Summary     Date: 22 Room / Location:  [facility] /  [facility]    Anesthesia Start: 947 Anesthesia Stop:     Procedure:  SECTION REPEAT (N/A Abdomen) Diagnosis:     Surgeons: Emily Lopez MD Provider: Alpesh Hector MD    Anesthesia Type: spinal ASA Status: 3          Anesthesia Type: spinal    Vitals  Vitals Value Taken Time   BP 95/49 22 1315   Temp 36.5 °C (97.7 °F) 22 1310   Pulse 69 22 1315   Resp 16 22 1310   SpO2 100 % 22 1310           Post Anesthesia Care and Evaluation    Patient location during evaluation: bedside  Patient participation: complete - patient participated  Level of consciousness: awake and alert  Pain management: adequate    Airway patency: patent  Anesthetic complications: No anesthetic complications    Cardiovascular status: acceptable  Respiratory status: acceptable  Hydration status: acceptable    Comments: BP 95/49   Pulse 69   Temp 36.5 °C (97.7 °F) (Oral)   Resp 16   Ht 167.6 cm (66\")   Wt 121 kg (267 lb 9.6 oz)   SpO2 100%   Breastfeeding Yes   BMI 43.19 kg/m²       "

## 2022-07-07 NOTE — LACTATION NOTE
P3. Mom reports she tried to BF her second child but introduced formula early and then switched to formula. She is wanting assistance with waking and latching baby. LC assisting mom. Baby latching well with strong suck at this time. Encouraged mom to BF at least every 2-3 hours for 10-15 min on each breast. Educated on importance of deep latching and ways to achieve it. Encouraged to call LC as needed. Mom has personal pump  Lactation Consult Note    Evaluation Completed: 2022 16:50 EDT  Patient Name: Amira Scanlon  :  1985  MRN:  [id number]     REFERRAL  INFORMATION:                                         DELIVERY HISTORY:        Skin to skin initiation date/time:      Skin to skin end date/time:           MATERNAL ASSESSMENT:                               INFANT ASSESSMENT:  Information for the patient's :  Deep Scanlon [[id number]]   No past medical history on file.                                                                                                     MATERNAL INFANT FEEDING:                                                                       EQUIPMENT TYPE:                                 BREAST PUMPING:          LACTATION REFERRALS:

## 2022-07-07 NOTE — OP NOTE
Section Op Note    Operative Note    Patient Identification:  Name:  Amira MANZO Black  Age:  37 y.o.  :  1985  MRN:  [id number]      2022    Pre-operative Diagnosis:  37 y.o. yr/o  at 39w2d, Prior , MOB, AMA, Rh neg    Post-operative Diagnosis: As above, moderate scar tissue    Procedure: Repeat LTCS    Surgeon: Emily Lopez MD    Assistant: Dr Young    Anesthesia: spinal    Findings: LV female infant, apgars 8/9, weight 7# 13 oz.  Normal tubes and ovaries bilaterally.  Moderate scar tissue in subcutaneous tissue    Complications: None at the time of surgery    Estimated Blood Loss: 350 ml    Drains: zamorano    Specimens:  * No order type specified *    Description of procedure:     The patient was counseled regarding indications, risks, benefits, and alternatives of the procedure, and informed consent was obtained.  All questions were answered.  The patient was taken to the operating room where the above anesthesia was found to be adequate.  She was prepped and draped in the usual sterile fashion in a dorsal supine position with a leftward tilt.  A Pfannenstiel incision was made with the scalpel and carried down to the underlying layer of fascia with the electrocautery.  The fascia was incised in the midline and the fascial incision was extended laterally with dobbs scissors under direct visualization.  The superior aspect of the fascia was grasped with kocher clamps and the rectus muscles dissected off sharply.  Attention was then turned to the inferior aspect of the fascia.  It was grasped with kocher clamps and the rectus muscles dissected off sharply.  The rectus muscles were seperated in the midline.  The peritoneum was identified and entered in and the incision extended superiorly and inferiorly with good visualization of the bladder.    The bladder blade was inserted.  The lower uterine segment was identified.  A low transverse uterine incision was made with the scalpel  and extended bluntly.  Amniotic fluid was clear.  The infants head was delivered without difficulty.  The baby's nose and oropharynx were bulb suctioned.  The rest of the infants body was delivered atraumatically.  The cord was clamped x2 and cut.  The infant was passed to the awaiting  team.  Cord blood specimens were obtained.   The placenta was delivered spontaneously.      The patient was given IV Pitocin and uterine tone was good.  The uterus was exteriorized and cleared of clots and debris. The uterine incision was closed with 0-Vicryl in a running locked fashion. There was good hemostasis. The tubes and ovaries were inspected and were normal in appearance.  The uterus was returned to the abdomen.  Both paracolic gutters were irrigated. The peritoneum was closed with 2-0  vicryl in a running fashion.     The subfascial areas were inspected and small bleeding points coagulated with electrocautery.  The fascia was closed in a running fashion with a 0-vicryl.  The subcutaneous tissue was irrigated and small bleeding points cauterized with electrocautery.  The subcutaneous tissue was reapproximated with 3-0 vicryl.    The skin was closed with 3-0 monocryl.  .  The patient tolerated the procedure well.  There were no complications at the time of the end of the surgery.  Sponge, needle and instrument counts were correct per nursing.  The patient was taken to recovery in stable condition.      Emily Lopez MD  2022 11:11 EDT

## 2022-07-07 NOTE — H&P
Labor and Delivery H&P    Name: Amira Scanlon  : 1985  MRN: [id number]      HPI:  Amira Scanlon is a 37 y.o. female at 39w2d by Estimated Date of Delivery: 22 gestation who is being admitted for rpt c/s.  Her current obstetrical history is significant for 1. Obesity, 2. Prior c/s.  Patient reports no complaints.   Fetal Movement: normal.    Past Medical History:   Diagnosis Date   • Abnormal Pap smear of cervix    • COVID 2021       Past Surgical History:   Procedure Laterality Date   •  SECTION     • CHOLECYSTECTOMY     • WISDOM TOOTH EXTRACTION       No Known Allergies     reports that she has never smoked. She has never used smokeless tobacco. She reports previous alcohol use. She reports that she does not use drugs.     Objective     Vital signs in last 24 hours:  Temp:  [98.4 °F (36.9 °C)] 98.4 °F (36.9 °C)  Heart Rate:  [] 91  Resp:  [16] 16  BP: ()/(41-74) 114/73    General:   alert, appears stated age and cooperative   Skin:   normal   HEENT:  extra ocular movement intact and sclera clear, anicteric   Abdomen:  soft, non-tender; bowel sounds normal; no masses,  no organomegaly       FHT:  130 BPM, + accels, no decels, mod variability   [place] occ ctx           Cervix:    Dilation: deferred   Effacement:    Station:     Consistency:    Position:       Prenatal Lab Review   A, Rh -, Rubella-immune, Hepatitis B surface antigen non-reactive, GBS negative   One hour GTT: Normal    Lab Review:  Lab Results (last 24 hours)     Procedure Component Value Units Date/Time    COVID PRE-OP / PRE-PROCEDURE SCREENING ORDER (NO ISOLATION) - Swab, Nasopharynx [[id number]]  (Normal) Collected: 22    Specimen: Swab from Nasopharynx Updated: 22    Narrative:      The following orders were created for panel order COVID PRE-OP / PRE-PROCEDURE SCREENING ORDER (NO ISOLATION) - Swab, Nasopharynx.  Procedure                               Abnormality         Status                      ---------                               -----------         ------                     COVID-19,[facility] IN-HOUSE...[[id number]]  Normal              Final result                 Please view results for these tests on the individual orders.    COVID-19,[facility] IN-HOUSE CEPHEID/YAMILET NP SWAB IN TRANSPORT MEDIA 8-12 HR TAT - Swab, Nasopharynx [[id number]]  (Normal) Collected: 22    Specimen: Swab from Nasopharynx Updated: 22     COVID19 Not Detected    Narrative:      Fact sheet for providers: https://www.fda.gov/media/559882/download    Fact sheet for patients: https://www.fda.gov/media/862327/download    Test performed by PCR.    CBC (No Diff) [[id number]]  (Abnormal) Collected: 22    Specimen: Blood Updated: 22     WBC 10.16 10*3/mm3      RBC 3.82 10*6/mm3      Hemoglobin 11.3 g/dL      Hematocrit 32.4 %      MCV 84.8 fL      MCH 29.6 pg      MCHC 34.9 g/dL      RDW 14.5 %      RDW-SD 43.9 fl      MPV 10.3 fL      Platelets 205 10*3/mm3     SCANNED - LABS [[id number]] Resulted: 22     Updated: 22 1106    SCANNED - LABS [[id number]] Resulted: 22     Updated: 22 1105              Assessment/Plan:  37 y.o. female at 39w2d by Estimated Date of Delivery: 22   1.  Fetus.  2.  Prior c/s declines .  R/b/a discussed w/ pt previously in the office and consent signed.  No other questions today.  Desires to proceed [facility] for rpt.   3.  MOB  4.  Rh neg.  S/p rhogam.  5.  AMA      Risks, benefits, alternatives and possible complications have been discussed in detail with the patient.  Pre-admission, admission, and post admission procedures and expectations were discussed in detail.  All questions answered, all appropriate consents will be signed at the Hospital. Admission is planned for today.    Emily Lopez MD  2022 09:42 EDT

## 2022-07-08 LAB
BASOPHILS # BLD AUTO: 0.03 10*3/MM3 (ref 0–0.2)
BASOPHILS NFR BLD AUTO: 0.3 % (ref 0–1.5)
DEPRECATED RDW RBC AUTO: 45.4 FL (ref 37–54)
EOSINOPHIL # BLD AUTO: 0.16 10*3/MM3 (ref 0–0.4)
EOSINOPHIL NFR BLD AUTO: 1.7 % (ref 0.3–6.2)
ERYTHROCYTE [DISTWIDTH] IN BLOOD BY AUTOMATED COUNT: 14.4 % (ref 12.3–15.4)
HCT VFR BLD AUTO: 28.1 % (ref 34–46.6)
HGB BLD-MCNC: 9.4 G/DL (ref 12–15.9)
IMM GRANULOCYTES # BLD AUTO: 0.04 10*3/MM3 (ref 0–0.05)
IMM GRANULOCYTES NFR BLD AUTO: 0.4 % (ref 0–0.5)
LYMPHOCYTES # BLD AUTO: 0.96 10*3/MM3 (ref 0.7–3.1)
LYMPHOCYTES NFR BLD AUTO: 10.4 % (ref 19.6–45.3)
MCH RBC QN AUTO: 28.9 PG (ref 26.6–33)
MCHC RBC AUTO-ENTMCNC: 33.5 G/DL (ref 31.5–35.7)
MCV RBC AUTO: 86.5 FL (ref 79–97)
MONOCYTES # BLD AUTO: 0.58 10*3/MM3 (ref 0.1–0.9)
MONOCYTES NFR BLD AUTO: 6.3 % (ref 5–12)
NEUTROPHILS NFR BLD AUTO: 7.43 10*3/MM3 (ref 1.7–7)
NEUTROPHILS NFR BLD AUTO: 80.9 % (ref 42.7–76)
NRBC BLD AUTO-RTO: 0.1 /100 WBC (ref 0–0.2)
PLATELET # BLD AUTO: 188 10*3/MM3 (ref 140–450)
PMV BLD AUTO: 10.4 FL (ref 6–12)
RBC # BLD AUTO: 3.25 10*6/MM3 (ref 3.77–5.28)
WBC NRBC COR # BLD: 9.2 10*3/MM3 (ref 3.4–10.8)

## 2022-07-08 PROCEDURE — 85025 COMPLETE CBC W/AUTO DIFF WBC: CPT | Performed by: OBSTETRICS & GYNECOLOGY

## 2022-07-08 RX ADMIN — CEPHALEXIN 500 MG: 500 CAPSULE ORAL at 05:54

## 2022-07-08 RX ADMIN — METRONIDAZOLE 500 MG: 500 TABLET, FILM COATED ORAL at 08:19

## 2022-07-08 RX ADMIN — METRONIDAZOLE 500 MG: 500 TABLET, FILM COATED ORAL at 15:33

## 2022-07-08 RX ADMIN — IBUPROFEN 600 MG: 600 TABLET ORAL at 00:12

## 2022-07-08 RX ADMIN — IBUPROFEN 600 MG: 600 TABLET ORAL at 05:54

## 2022-07-08 RX ADMIN — CEPHALEXIN 500 MG: 500 CAPSULE ORAL at 15:33

## 2022-07-08 RX ADMIN — IBUPROFEN 600 MG: 600 TABLET ORAL at 23:56

## 2022-07-08 RX ADMIN — Medication 1 APPLICATION: at 22:50

## 2022-07-08 RX ADMIN — CEPHALEXIN 500 MG: 500 CAPSULE ORAL at 22:50

## 2022-07-08 RX ADMIN — METRONIDAZOLE 500 MG: 500 TABLET, FILM COATED ORAL at 23:56

## 2022-07-08 RX ADMIN — ACETAMINOPHEN 650 MG: 325 TABLET, FILM COATED ORAL at 20:18

## 2022-07-08 RX ADMIN — METRONIDAZOLE 500 MG: 500 TABLET, FILM COATED ORAL at 00:27

## 2022-07-08 RX ADMIN — ACETAMINOPHEN 650 MG: 325 TABLET, FILM COATED ORAL at 12:03

## 2022-07-08 RX ADMIN — ACETAMINOPHEN 650 MG: 325 TABLET, FILM COATED ORAL at 23:56

## 2022-07-08 RX ADMIN — IBUPROFEN 600 MG: 600 TABLET ORAL at 12:03

## 2022-07-08 RX ADMIN — ACETAMINOPHEN 650 MG: 325 TABLET, FILM COATED ORAL at 04:21

## 2022-07-08 RX ADMIN — IBUPROFEN 600 MG: 600 TABLET ORAL at 18:12

## 2022-07-08 RX ADMIN — ACETAMINOPHEN 650 MG: 325 TABLET, FILM COATED ORAL at 00:12

## 2022-07-08 NOTE — PROGRESS NOTES
OB Postpartum Progress Note    Patient Identification:  Name: Amira Scanlon  :  1985  MRN: [id number]    POD 1  S/P LTCS  Date: 2022 Time: 12:33 EDT    S:  Pt without c/o.  Pain well-controlled. Ambulating/Voiding w/o difficulty. Tolerating PO. Pt is breast feeding.  Lochia equal to menses.    O:    BP      Temp      Pulse     Resp      SpO2          Abd - fundus firm below umbilicus, ATTP, ND/soft  Incision - dressing intact, no erythema no drainage no induration  Ext - trace edema, NT/Symm    Lab Results (last 24 hours)     Procedure Component Value Units Date/Time    CBC & Differential [[id number]]  (Abnormal) Collected: 22    Specimen: Blood Updated: 22    Narrative:      The following orders were created for panel order CBC & Differential.  Procedure                               Abnormality         Status                     ---------                               -----------         ------                     CBC Auto Differential[[id number]]        Abnormal            Final result                 Please view results for these tests on the individual orders.    CBC Auto Differential [[id number]]  (Abnormal) Collected: 22    Specimen: Blood Updated: 22     WBC 9.20 10*3/mm3      RBC 3.25 10*6/mm3      Hemoglobin 9.4 g/dL      Hematocrit 28.1 %      MCV 86.5 fL      MCH 28.9 pg      MCHC 33.5 g/dL      RDW 14.4 %      RDW-SD 45.4 fl      MPV 10.4 fL      Platelets 188 10*3/mm3      Neutrophil % 80.9 %      Lymphocyte % 10.4 %      Monocyte % 6.3 %      Eosinophil % 1.7 %      Basophil % 0.3 %      Immature Grans % 0.4 %      Neutrophils, Absolute 7.43 10*3/mm3      Lymphocytes, Absolute 0.96 10*3/mm3      Monocytes, Absolute 0.58 10*3/mm3      Eosinophils, Absolute 0.16 10*3/mm3      Basophils, Absolute 0.03 10*3/mm3      Immature Grans, Absolute 0.04 10*3/mm3      nRBC 0.1 /100 WBC     Hepatitis B Surface Antigen [[id number]] Resulted: 22    Specimen:  Blood Updated: 07/07/22 1401     External Hepatitis B Surface Ag Negative    RPR [[id number]] Resulted: 01/25/22    Specimen: Blood Updated: 07/07/22 1401     External RPR Non-Reactive    Rubella Antibody, IgG [[id number]] Resulted: 01/25/22    Specimen: Blood Updated: 07/07/22 1401     External Rubella Qual Immune    HIV-1 Antibody, EIA [[id number]] Resulted: 01/25/22    Specimen: Blood Updated: 07/07/22 1401     External HIV Antibody Non-Reactive    Hepatitis C Antibody [[id number]] Resulted: 01/25/22    Specimen: Blood Updated: 07/07/22 1401     External Hepatitis C Ab negative            MBT = A neg, s/p rhogam, Rubella immune    A/P:   Active Problems:    Pregnancy  Overview:  Resolved Problems:    * No resolved hospital problems. *      POD 1 - s/p LTCS  Doing well. Continue routine care.    Ingrid Lang MD  7/8/2022 12:33 EDT

## 2022-07-08 NOTE — PLAN OF CARE
Goal Outcome Evaluation:  Plan of Care Reviewed With: patient, spouse  Progress: improving  Outcome Evaluation: VSS. Ping pong ball sized clot noted earlier in shift, bleeding WNL and fundus firm. Adequate output via fc, fc to be dc'd this AM per order. Ambulated once to bathroom and will ambulate a second time before end of shift. Pain control with PRN Tylenol and scheduled Motrin. On scheduled PO antibiotics. Breastfeeding. Spouse at bedside.

## 2022-07-09 RX ADMIN — ACETAMINOPHEN 650 MG: 325 TABLET, FILM COATED ORAL at 06:00

## 2022-07-09 RX ADMIN — CEPHALEXIN 500 MG: 500 CAPSULE ORAL at 07:42

## 2022-07-09 RX ADMIN — METRONIDAZOLE 500 MG: 500 TABLET, FILM COATED ORAL at 07:42

## 2022-07-09 RX ADMIN — IBUPROFEN 600 MG: 600 TABLET ORAL at 06:00

## 2022-07-09 RX ADMIN — IBUPROFEN 600 MG: 600 TABLET ORAL at 19:46

## 2022-07-09 RX ADMIN — ACETAMINOPHEN 650 MG: 325 TABLET, FILM COATED ORAL at 14:30

## 2022-07-09 RX ADMIN — ACETAMINOPHEN 650 MG: 325 TABLET, FILM COATED ORAL at 19:46

## 2022-07-09 RX ADMIN — IBUPROFEN 600 MG: 600 TABLET ORAL at 14:30

## 2022-07-09 NOTE — LACTATION NOTE
Mom reports baby is latching well without nipple shield. She reports she has not insurance pumped yet. Encouraged to cont latching baby every 2-3 hours and insurance pump. Mom denies questions at this time. Call LC as needed  Lactation Consult Note    Evaluation Completed: 2022 09:57 EDT  Patient Name: Amira Scanlon  :  1985  MRN:  [id number]     REFERRAL  INFORMATION:                          Date of Referral: 22              DELIVERY HISTORY:        Skin to skin initiation date/time:      Skin to skin end date/time:           MATERNAL ASSESSMENT:     Breast Shape: round (22 1630)  Breast Density: soft (22 1630)  Areola: elastic (22 1630)  Nipples: everted (22 163)                INFANT ASSESSMENT:  Information for the patient's :  Deep Scanlon [[id number]]   No past medical history on file.                                                                                                     MATERNAL INFANT FEEDING:     Maternal Emotional State: relaxed (22 1630)  Infant Positioning: clutch/football (22 1630)   Signs of Milk Transfer: deep jaw excursions noted (22 1630)  Pain with Feeding: no (22 1630)                       Latch Assistance: minimal assistance (22 1630)                               EQUIPMENT TYPE:                                 BREAST PUMPING:          LACTATION REFERRALS:

## 2022-07-09 NOTE — PROGRESS NOTES
Section Progress Note    Assessment & Plan     Status post  section: Doing well postoperatively.     Continue current care.    Subjective     Postpartum Day 2:  Delivery    The patient feels well. The patient denies emotional concerns. Pain is well controlled with current medications. The baby iswell. Baby is feeding via breast. Urinary output is adequate. The patient is ambulating well. The patient is tolerating a normal diet. Patient denies vomiting and abdominal pain flatus.    Objective     Vital signs in last 24 hours:  Temp:  [97.6 °F (36.4 °C)-98.3 °F (36.8 °C)] 97.9 °F (36.6 °C)  Heart Rate:  [80-89] 89  Resp:  [16] 16  BP: (109-123)/(74-78) 123/78      General:    alert, appears stated age and cooperative   Bowel Sounds:  active   Lochia:  appropriate   Uterine Fundus:   firm   Incision:  healing well, no significant drainage, no dehiscence, no significant erythema   DVT Evaluation:  No evidence of DVT seen on physical exam.

## 2022-07-10 VITALS
WEIGHT: 267.6 LBS | BODY MASS INDEX: 43.01 KG/M2 | OXYGEN SATURATION: 98 % | HEART RATE: 85 BPM | SYSTOLIC BLOOD PRESSURE: 118 MMHG | TEMPERATURE: 98.4 F | DIASTOLIC BLOOD PRESSURE: 67 MMHG | HEIGHT: 66 IN | RESPIRATION RATE: 16 BRPM

## 2022-07-10 RX ORDER — CALCIUM CARBONATE 200(500)MG
2 TABLET,CHEWABLE ORAL 3 TIMES DAILY PRN
Status: DISCONTINUED | OUTPATIENT
Start: 2022-07-10 | End: 2022-07-10 | Stop reason: HOSPADM

## 2022-07-10 RX ORDER — IBUPROFEN 600 MG/1
600 TABLET ORAL EVERY 6 HOURS SCHEDULED
Qty: 40 TABLET | Refills: 1 | Status: SHIPPED | OUTPATIENT
Start: 2022-07-10

## 2022-07-10 RX ORDER — ACETAMINOPHEN 325 MG/1
650 TABLET ORAL EVERY 4 HOURS PRN
Qty: 20 TABLET | Refills: 1 | Status: SHIPPED | OUTPATIENT
Start: 2022-07-10

## 2022-07-10 RX ADMIN — IBUPROFEN 600 MG: 600 TABLET ORAL at 07:54

## 2022-07-10 RX ADMIN — ACETAMINOPHEN 650 MG: 325 TABLET, FILM COATED ORAL at 13:47

## 2022-07-10 RX ADMIN — IBUPROFEN 600 MG: 600 TABLET ORAL at 02:16

## 2022-07-10 RX ADMIN — IBUPROFEN 600 MG: 600 TABLET ORAL at 13:47

## 2022-07-10 RX ADMIN — ACETAMINOPHEN 650 MG: 325 TABLET, FILM COATED ORAL at 02:16

## 2022-07-10 RX ADMIN — ACETAMINOPHEN 650 MG: 325 TABLET, FILM COATED ORAL at 07:54

## 2022-07-10 RX ADMIN — ANTACID TABLETS 2 TABLET: 500 TABLET, CHEWABLE ORAL at 03:09

## 2022-07-10 NOTE — PLAN OF CARE
Goal Outcome Evaluation:   Pt. Meeting goals. VSS. No s/s hypoglycemia or infection noted. Abdominal incision healing well. Pt. Claims good pain relief with tylenol and ibuprofen. Pt. Ambulating in room and passing flatus. Fundus is firm and bleeding is light. Patient showing good bonding with infant. No falls.

## 2022-07-10 NOTE — LACTATION NOTE
Mom reports baby is nursing well. She denies questions at this time. Educated on baby's expected output and weight gain. Mom has [facility] info    Lactation Consult Note    Evaluation Completed: 7/10/2022 11:39 EDT  Patient Name: Amira Scanlon  :  1985  MRN:  [id number]     REFERRAL  INFORMATION:                          Date of Referral: 22              DELIVERY HISTORY:        Skin to skin initiation date/time:      Skin to skin end date/time:           MATERNAL ASSESSMENT:     Breast Shape: round (22 1630)  Breast Density: soft (22 1630)  Areola: elastic (22 1630)  Nipples: everted (22 1630)                INFANT ASSESSMENT:  Information for the patient's :  Deep Scanlon [[id number]]   No past medical history on file.                                                                                                     MATERNAL INFANT FEEDING:     Maternal Emotional State: relaxed (22 1630)  Infant Positioning: clutch/football (22 1630)   Signs of Milk Transfer: deep jaw excursions noted (22 1630)  Pain with Feeding: no (22 1630)                       Latch Assistance: minimal assistance (22 1630)                               EQUIPMENT TYPE:                                 BREAST PUMPING:          LACTATION REFERRALS:

## 2022-07-10 NOTE — DISCHARGE SUMMARY
[facility]  Delivery Discharge Summary    Primary OB Clinician:     EDC: Estimated Date of Delivery: 22    Gestational Age:39w2d    Antepartum complications: none    Date of Delivery: 2022   Time of Delivery: 10:24 AM     Delivered By:  Emily Lopez     Delivery Type: , Low Transverse      Tubal Ligation: n/a    Baby:female  infant;   Apgar:  8  @ 1 minute /   Apgar:  9  @ 5 minutes   Weight: 3560 g (7 lb 13.6 oz)    Length: 19     Anesthesia: Spinal      Intrapartum complications: None    Laceration: No    Episiotomy: No    Placenta: Spontaneous     Feeding method: Breastfeeding Status: Yes    Rh Immune globulin given: yes    Rubella vaccine given: not applicable    Discharge Date: 7/10/2022; Discharge Time: 13:33 EDT    Early Discharge:  NO    Plan:    Address and phone number verified and same.  Follow-up appointment with [facility] in 6 weeks

## 2025-04-08 ENCOUNTER — OFFICE (AMBULATORY)
Dept: URBAN - METROPOLITAN AREA CLINIC 76 | Facility: CLINIC | Age: 40
End: 2025-04-08

## 2025-04-08 VITALS
DIASTOLIC BLOOD PRESSURE: 60 MMHG | SYSTOLIC BLOOD PRESSURE: 104 MMHG | OXYGEN SATURATION: 97 % | HEIGHT: 66 IN | WEIGHT: 163 LBS | HEART RATE: 67 BPM

## 2025-04-08 DIAGNOSIS — K52.9 NONINFECTIVE GASTROENTERITIS AND COLITIS, UNSPECIFIED: ICD-10-CM

## 2025-04-08 PROCEDURE — 99204 OFFICE O/P NEW MOD 45 MIN: CPT

## 2025-04-08 RX ORDER — COLESTIPOL HYDROCHLORIDE 1 G/1
TABLET, FILM COATED ORAL
Qty: 180 | Refills: 0 | Status: ACTIVE

## 2025-04-09 LAB
C-REACTIVE PROTEIN, QUANT: <1 MG/L
CELIAC DISEASE COMPREHENSIVE: DEAMIDATED GLIADIN ABS, IGA: 3 UNITS (ref 0–19)
CELIAC DISEASE COMPREHENSIVE: DEAMIDATED GLIADIN ABS, IGG: 3 UNITS (ref 0–19)
CELIAC DISEASE COMPREHENSIVE: ENDOMYSIAL ANTIBODY IGA: NEGATIVE
CELIAC DISEASE COMPREHENSIVE: IMMUNOGLOBULIN A, QN, SERUM: 84 MG/DL — LOW (ref 87–352)
CELIAC DISEASE COMPREHENSIVE: T-TRANSGLUTAMINASE (TTG) IGA: <2 U/ML
CELIAC DISEASE COMPREHENSIVE: T-TRANSGLUTAMINASE (TTG) IGG: 3 U/ML (ref 0–5)
TSH: 3.98 UIU/ML (ref 0.45–4.5)

## 2025-04-17 PROBLEM — K64.0 FIRST DEGREE HEMORRHOIDS: Status: ACTIVE | Noted: 2025-04-17

## 2025-07-08 ENCOUNTER — OFFICE (AMBULATORY)
Dept: URBAN - METROPOLITAN AREA CLINIC 76 | Facility: CLINIC | Age: 40
End: 2025-07-08

## (undated) DEVICE — SUT VIC PLS 0 CTX 36IN UD VCP978H

## (undated) DEVICE — ANTIBACTERIAL UNDYED BRAIDED (POLYGLACTIN 910), SYNTHETIC ABSORBABLE SUTURE: Brand: COATED VICRYL

## (undated) DEVICE — TP PAPR MICROPORE 2IN

## (undated) DEVICE — SOL IRR H2O BTL 1000ML STRL

## (undated) DEVICE — GLV SURG SENSICARE MICRO PF LF 6.5 STRL

## (undated) DEVICE — SUT MONOCRYL PLS ANTIB UND 3/0  PS1 27IN

## (undated) DEVICE — SLV SCD KN ADJ EXPRSS LG